# Patient Record
Sex: MALE | Race: WHITE | Employment: OTHER
[De-identification: names, ages, dates, MRNs, and addresses within clinical notes are randomized per-mention and may not be internally consistent; named-entity substitution may affect disease eponyms.]

---

## 2024-01-28 ENCOUNTER — APPOINTMENT (OUTPATIENT)
Facility: HOSPITAL | Age: 59
DRG: 312 | End: 2024-01-28
Payer: COMMERCIAL

## 2024-01-28 ENCOUNTER — HOSPITAL ENCOUNTER (INPATIENT)
Facility: HOSPITAL | Age: 59
LOS: 2 days | Discharge: HOME | DRG: 312 | End: 2024-01-30
Attending: EMERGENCY MEDICINE | Admitting: INTERNAL MEDICINE
Payer: COMMERCIAL

## 2024-01-28 DIAGNOSIS — R55 SYNCOPE AND COLLAPSE: Primary | ICD-10-CM

## 2024-01-28 LAB
ALBUMIN SERPL-MCNC: 3.9 G/DL (ref 3.5–5)
ALBUMIN/GLOB SERPL: 1.2 (ref 1.1–2.2)
ALP SERPL-CCNC: 66 U/L (ref 45–117)
ALT SERPL-CCNC: 59 U/L (ref 12–78)
ANION GAP SERPL CALC-SCNC: 5 MMOL/L (ref 5–15)
AST SERPL-CCNC: 29 U/L (ref 15–37)
BASOPHILS # BLD: 0.1 K/UL (ref 0–0.1)
BASOPHILS NFR BLD: 1 % (ref 0–1)
BILIRUB SERPL-MCNC: 0.6 MG/DL (ref 0.2–1)
BUN SERPL-MCNC: 15 MG/DL (ref 6–20)
BUN/CREAT SERPL: 15 (ref 12–20)
CALCIUM SERPL-MCNC: 9 MG/DL (ref 8.5–10.1)
CHLORIDE SERPL-SCNC: 107 MMOL/L (ref 97–108)
CO2 SERPL-SCNC: 27 MMOL/L (ref 21–32)
COMMENT:: NORMAL
CREAT SERPL-MCNC: 1.02 MG/DL (ref 0.7–1.3)
D DIMER PPP FEU-MCNC: <0.19 MG/L FEU (ref 0–0.65)
DIFFERENTIAL METHOD BLD: ABNORMAL
EKG ATRIAL RATE: 57 BPM
EKG DIAGNOSIS: NORMAL
EKG P AXIS: 71 DEGREES
EKG P-R INTERVAL: 160 MS
EKG Q-T INTERVAL: 424 MS
EKG QRS DURATION: 86 MS
EKG QTC CALCULATION (BAZETT): 412 MS
EKG R AXIS: 21 DEGREES
EKG T AXIS: 40 DEGREES
EKG VENTRICULAR RATE: 57 BPM
EOSINOPHIL # BLD: 0.3 K/UL (ref 0–0.4)
EOSINOPHIL NFR BLD: 4 % (ref 0–7)
ERYTHROCYTE [DISTWIDTH] IN BLOOD BY AUTOMATED COUNT: 12.4 % (ref 11.5–14.5)
GLOBULIN SER CALC-MCNC: 3.3 G/DL (ref 2–4)
GLUCOSE BLD STRIP.AUTO-MCNC: 140 MG/DL (ref 65–117)
GLUCOSE SERPL-MCNC: 146 MG/DL (ref 65–100)
HCT VFR BLD AUTO: 41.1 % (ref 36.6–50.3)
HGB BLD-MCNC: 14.3 G/DL (ref 12.1–17)
IMM GRANULOCYTES # BLD AUTO: 0.1 K/UL (ref 0–0.04)
IMM GRANULOCYTES NFR BLD AUTO: 1 % (ref 0–0.5)
LYMPHOCYTES # BLD: 2.3 K/UL (ref 0.8–3.5)
LYMPHOCYTES NFR BLD: 33 % (ref 12–49)
MAGNESIUM SERPL-MCNC: 2.2 MG/DL (ref 1.6–2.4)
MCH RBC QN AUTO: 30.8 PG (ref 26–34)
MCHC RBC AUTO-ENTMCNC: 34.8 G/DL (ref 30–36.5)
MCV RBC AUTO: 88.6 FL (ref 80–99)
MONOCYTES # BLD: 0.6 K/UL (ref 0–1)
MONOCYTES NFR BLD: 9 % (ref 5–13)
NEUTS SEG # BLD: 3.5 K/UL (ref 1.8–8)
NEUTS SEG NFR BLD: 52 % (ref 32–75)
NRBC # BLD: 0 K/UL (ref 0–0.01)
NRBC BLD-RTO: 0 PER 100 WBC
PLATELET # BLD AUTO: 264 K/UL (ref 150–400)
PMV BLD AUTO: 10 FL (ref 8.9–12.9)
POTASSIUM SERPL-SCNC: 4.4 MMOL/L (ref 3.5–5.1)
PROT SERPL-MCNC: 7.2 G/DL (ref 6.4–8.2)
RBC # BLD AUTO: 4.64 M/UL (ref 4.1–5.7)
SERVICE CMNT-IMP: ABNORMAL
SODIUM SERPL-SCNC: 139 MMOL/L (ref 136–145)
SPECIMEN HOLD: NORMAL
TROPONIN I SERPL HS-MCNC: 5 NG/L (ref 0–76)
TROPONIN I SERPL HS-MCNC: 6 NG/L (ref 0–76)
WBC # BLD AUTO: 6.8 K/UL (ref 4.1–11.1)

## 2024-01-28 PROCEDURE — 96374 THER/PROPH/DIAG INJ IV PUSH: CPT

## 2024-01-28 PROCEDURE — 83735 ASSAY OF MAGNESIUM: CPT

## 2024-01-28 PROCEDURE — 2580000003 HC RX 258: Performed by: EMERGENCY MEDICINE

## 2024-01-28 PROCEDURE — 93005 ELECTROCARDIOGRAM TRACING: CPT | Performed by: EMERGENCY MEDICINE

## 2024-01-28 PROCEDURE — 2060000000 HC ICU INTERMEDIATE R&B

## 2024-01-28 PROCEDURE — 36415 COLL VENOUS BLD VENIPUNCTURE: CPT

## 2024-01-28 PROCEDURE — 99285 EMERGENCY DEPT VISIT HI MDM: CPT

## 2024-01-28 PROCEDURE — 85025 COMPLETE CBC W/AUTO DIFF WBC: CPT

## 2024-01-28 PROCEDURE — 84484 ASSAY OF TROPONIN QUANT: CPT

## 2024-01-28 PROCEDURE — 85379 FIBRIN DEGRADATION QUANT: CPT

## 2024-01-28 PROCEDURE — 71045 X-RAY EXAM CHEST 1 VIEW: CPT

## 2024-01-28 PROCEDURE — 70450 CT HEAD/BRAIN W/O DYE: CPT

## 2024-01-28 PROCEDURE — 6360000002 HC RX W HCPCS: Performed by: EMERGENCY MEDICINE

## 2024-01-28 PROCEDURE — 96361 HYDRATE IV INFUSION ADD-ON: CPT

## 2024-01-28 PROCEDURE — 93010 ELECTROCARDIOGRAM REPORT: CPT | Performed by: INTERNAL MEDICINE

## 2024-01-28 PROCEDURE — 80053 COMPREHEN METABOLIC PANEL: CPT

## 2024-01-28 PROCEDURE — 82962 GLUCOSE BLOOD TEST: CPT

## 2024-01-28 RX ORDER — TIMOLOL MALEATE 5 MG/ML
1 SOLUTION/ DROPS OPHTHALMIC DAILY
COMMUNITY

## 2024-01-28 RX ORDER — ROSUVASTATIN CALCIUM 5 MG/1
5 TABLET, COATED ORAL DAILY
COMMUNITY

## 2024-01-28 RX ORDER — ACETAMINOPHEN 650 MG/1
650 SUPPOSITORY RECTAL EVERY 6 HOURS PRN
Status: DISCONTINUED | OUTPATIENT
Start: 2024-01-28 | End: 2024-01-30 | Stop reason: HOSPADM

## 2024-01-28 RX ORDER — DORZOLAMIDE HCL 20 MG/ML
1 SOLUTION/ DROPS OPHTHALMIC 2 TIMES DAILY
COMMUNITY

## 2024-01-28 RX ORDER — SODIUM CHLORIDE 9 MG/ML
INJECTION, SOLUTION INTRAVENOUS PRN
Status: DISCONTINUED | OUTPATIENT
Start: 2024-01-28 | End: 2024-01-30 | Stop reason: HOSPADM

## 2024-01-28 RX ORDER — ONDANSETRON 2 MG/ML
4 INJECTION INTRAMUSCULAR; INTRAVENOUS EVERY 6 HOURS PRN
Status: DISCONTINUED | OUTPATIENT
Start: 2024-01-28 | End: 2024-01-30 | Stop reason: HOSPADM

## 2024-01-28 RX ORDER — ALBUTEROL SULFATE 90 UG/1
2 AEROSOL, METERED RESPIRATORY (INHALATION) DAILY PRN
COMMUNITY

## 2024-01-28 RX ORDER — 0.9 % SODIUM CHLORIDE 0.9 %
1000 INTRAVENOUS SOLUTION INTRAVENOUS ONCE
Status: COMPLETED | OUTPATIENT
Start: 2024-01-28 | End: 2024-01-28

## 2024-01-28 RX ORDER — LATANOPROST 50 UG/ML
1 SOLUTION/ DROPS OPHTHALMIC NIGHTLY
COMMUNITY

## 2024-01-28 RX ORDER — ONDANSETRON 4 MG/1
4 TABLET, ORALLY DISINTEGRATING ORAL EVERY 8 HOURS PRN
Status: DISCONTINUED | OUTPATIENT
Start: 2024-01-28 | End: 2024-01-30 | Stop reason: HOSPADM

## 2024-01-28 RX ORDER — POLYETHYLENE GLYCOL 3350 17 G/17G
17 POWDER, FOR SOLUTION ORAL DAILY PRN
Status: DISCONTINUED | OUTPATIENT
Start: 2024-01-28 | End: 2024-01-30 | Stop reason: HOSPADM

## 2024-01-28 RX ORDER — ACETAMINOPHEN 325 MG/1
650 TABLET ORAL EVERY 6 HOURS PRN
Status: DISCONTINUED | OUTPATIENT
Start: 2024-01-28 | End: 2024-01-30 | Stop reason: HOSPADM

## 2024-01-28 RX ORDER — BRIMONIDINE TARTRATE 1 MG/ML
1 SOLUTION/ DROPS OPHTHALMIC 2 TIMES DAILY
COMMUNITY

## 2024-01-28 RX ORDER — SODIUM CHLORIDE 0.9 % (FLUSH) 0.9 %
5-40 SYRINGE (ML) INJECTION EVERY 12 HOURS SCHEDULED
Status: DISCONTINUED | OUTPATIENT
Start: 2024-01-28 | End: 2024-01-30 | Stop reason: HOSPADM

## 2024-01-28 RX ORDER — ONDANSETRON 2 MG/ML
4 INJECTION INTRAMUSCULAR; INTRAVENOUS ONCE
Status: COMPLETED | OUTPATIENT
Start: 2024-01-28 | End: 2024-01-28

## 2024-01-28 RX ORDER — ENOXAPARIN SODIUM 100 MG/ML
40 INJECTION SUBCUTANEOUS DAILY
Status: DISCONTINUED | OUTPATIENT
Start: 2024-01-28 | End: 2024-01-28

## 2024-01-28 RX ORDER — SODIUM CHLORIDE 0.9 % (FLUSH) 0.9 %
5-40 SYRINGE (ML) INJECTION PRN
Status: DISCONTINUED | OUTPATIENT
Start: 2024-01-28 | End: 2024-01-30 | Stop reason: HOSPADM

## 2024-01-28 RX ORDER — AMLODIPINE BESYLATE 10 MG/1
10 TABLET ORAL DAILY
Status: ON HOLD | COMMUNITY
End: 2024-01-30

## 2024-01-28 RX ORDER — ENOXAPARIN SODIUM 100 MG/ML
40 INJECTION SUBCUTANEOUS DAILY
Status: DISCONTINUED | OUTPATIENT
Start: 2024-01-29 | End: 2024-01-30 | Stop reason: HOSPADM

## 2024-01-28 RX ADMIN — SODIUM CHLORIDE 1000 ML: 9 INJECTION, SOLUTION INTRAVENOUS at 13:18

## 2024-01-28 RX ADMIN — ONDANSETRON HYDROCHLORIDE 4 MG: 2 INJECTION, SOLUTION INTRAMUSCULAR; INTRAVENOUS at 13:18

## 2024-01-28 ASSESSMENT — PAIN DESCRIPTION - ORIENTATION: ORIENTATION: MID

## 2024-01-28 ASSESSMENT — PAIN SCALES - GENERAL: PAINLEVEL_OUTOF10: 3

## 2024-01-28 ASSESSMENT — PAIN DESCRIPTION - LOCATION: LOCATION: CHEST

## 2024-01-28 ASSESSMENT — PAIN - FUNCTIONAL ASSESSMENT: PAIN_FUNCTIONAL_ASSESSMENT: 0-10

## 2024-01-28 ASSESSMENT — PAIN DESCRIPTION - DESCRIPTORS: DESCRIPTORS: TIGHTNESS

## 2024-01-28 NOTE — ED TRIAGE NOTES
Patient arrives by EMS for syncopal episode that occurred approximately 30 minutes prior to arrival while at [facility]. Patient reports some chest tightness, feelings of temperature fluctuations, and nausea prior to the episode. Reports losing consciousness and wife states he may have hit his head.

## 2024-01-28 NOTE — H&P
history were personally reviewed, all pertinent and relevant details are outlined as above.    Objective:   BP (!) 140/81   Pulse 69   Temp 97.4 °F (36.3 °C) (Oral)   Resp 15   SpO2 96%         PHYSICAL EXAM:   General: Alert x oriented x 3, awake, no acute distress,   HEENT: PEERL, EOMI, moist mucus membranes  Neck: Supple, no JVD, no meningeal signs  Chest: Clear to auscultation bilaterally   CVS: RRR, S1 S2 heard, no murmurs/rubs/gallops  Abd: Soft, non-tender, non-distended, +bowel sounds   Ext: No clubbing, no cyanosis, no edema  Neuro/Psych: Pleasant mood and affect, CN 2-12 grossly intact, sensory grossly within normal limit, Strength 5/5 in all extremities  Cap refill: Brisk, less than 3 seconds  Pulses: 2+, symmetric in all extremities  Skin: Warm, dry, without rashes or lesions    Data Review:   I have independently reviewed and interpreted patient's lab and all other diagnostic data    Abnormal Labs Reviewed   CBC WITH AUTO DIFFERENTIAL - Abnormal; Notable for the following components:       Result Value    Immature Granulocytes 1 (*)     Absolute Immature Granulocyte 0.1 (*)     All other components within normal limits   COMPREHENSIVE METABOLIC PANEL - Abnormal; Notable for the following components:    Glucose 146 (*)     All other components within normal limits   POCT GLUCOSE - Abnormal; Notable for the following components:    POC Glucose 140 (*)     All other components within normal limits       Results       ** No results found for the last 336 hours. **             IMAGING:   CT Head W/O Contrast   Final Result   No evidence of acute process.            XR CHEST PORTABLE   Final Result   No focal lung opacity or acute cardiopulmonary abnormality.                     ECG/ECHO:    Encounter Date: 01/28/24   EKG 12 Lead   Result Value    Ventricular Rate 57    Atrial Rate 57    P-R Interval 160    QRS Duration 86    Q-T Interval 424    QTc Calculation (Bazett) 412    P Axis 71    R Axis 21    T

## 2024-01-28 NOTE — ED PROVIDER NOTES
[facility] EMERGENCY DEP  EMERGENCY DEPARTMENT ENCOUNTER      Pt Name: Alvin López  MRN: [id number]  Birthdate 1965  Date of evaluation: 1/28/2024  Provider: Meño Stovall MD    CHIEF COMPLAINT       Chief Complaint   Patient presents with    Chest Pain    Loss of Consciousness         HISTORY OF PRESENT ILLNESS   (Location/Symptom, Timing/Onset, Context/Setting, Quality, Duration, Modifying Factors, Severity)  Note limiting factors.   Alvin López is a 58 y.o. male who presents to the emergency department      The history is provided by the patient. No  was used.   Loss of Consciousness  Episode history:  Single  Most recent episode:  Today  Timing:  Rare  Progression:  Resolved  Chronicity:  New  Context: medication change    Witnessed: yes    Ineffective treatments:  None tried  Associated symptoms: chest pain, dizziness, fever, malaise/fatigue, nausea and vomiting    Associated symptoms: no confusion, no focal weakness, no headaches, no palpitations, no seizures, no shortness of breath and no weakness        Nursing Notes were reviewed.    REVIEW OF SYSTEMS    (2-9 systems for level 4, 10 or more for level 5)     Review of Systems   Constitutional:  Positive for chills, fever and malaise/fatigue. Negative for activity change.   HENT:  Negative for nosebleeds.    Eyes:  Negative for visual disturbance.   Respiratory:  Negative for shortness of breath.    Cardiovascular:  Positive for chest pain and syncope. Negative for palpitations.   Gastrointestinal:  Positive for nausea and vomiting. Negative for abdominal pain, constipation and diarrhea.   Genitourinary:  Negative for difficulty urinating, dysuria, hematuria and urgency.   Musculoskeletal:  Negative for back pain, neck pain and neck stiffness.   Skin:  Negative for color change.   Allergic/Immunologic: Negative for immunocompromised state.   Neurological:  Positive for dizziness. Negative for focal weakness, seizures, syncope,

## 2024-01-28 NOTE — PROGRESS NOTES
Admission Medication Reconciliation:    Information obtained from:  Patient  RxQuery data available¹:  Yes    Comments/Recommendations: Updated PTA meds/reviewed patient's allergies.    1)  Patient was a good historian. Of note: primary provider had recently increased amlodipine from 5mg to 10mg. Also, patient stopped taking Rosuvastatin due to increased heart rate.    2)  Medication changes (since last review):  Added  - All medications    Adjusted  - None    Removed  - None     ¹RxQuery pharmacy benefit data reflects medications filled and processed through the patient's insurance, however   this data does NOT capture whether the medication was picked up or is currently being taken by the patient.    Allergies:  Patient has no known allergies.    Significant PMH/Disease States: No past medical history on file.  Chief Complaint for this Admission:    Chief Complaint   Patient presents with    Chest Pain    Loss of Consciousness     Prior to Admission Medications:   Prior to Admission Medications   Prescriptions Last Dose Informant   albuterol sulfate HFA (VENTOLIN HFA) 108 (90 Base) MCG/ACT inhaler     Sig: Inhale 2 puffs into the lungs daily as needed for Wheezing or Shortness of Breath (when excersising)   amLODIPine (NORVASC) 10 MG tablet     Sig: Take 1 tablet by mouth daily   brimonidine (ALPHAGAN P) 0.1 % SOLN     Sig: Place 1 drop into both eyes 2 times daily   dorzolamide (TRUSOPT) 2 % ophthalmic solution     Sig: Place 1 drop into the right eye in the morning and at bedtime   latanoprost (XALATAN) 0.005 % ophthalmic solution     Sig: Place 1 drop into both eyes nightly   rosuvastatin (CRESTOR) 5 MG tablet     Sig: Take 1 tablet by mouth daily   timolol (TIMOPTIC) 0.5 % ophthalmic solution     Sig: Place 1 drop into both eyes daily      Facility-Administered Medications: None     Please contact the main inpatient pharmacy with any questions or concerns at [phone] and we will direct you to the

## 2024-01-28 NOTE — ED NOTES
Patient's wife updating staff that patient received chest compression by bystanders who stated he appeared dusky.

## 2024-01-29 ENCOUNTER — APPOINTMENT (OUTPATIENT)
Facility: HOSPITAL | Age: 59
DRG: 312 | End: 2024-01-29
Attending: INTERNAL MEDICINE
Payer: COMMERCIAL

## 2024-01-29 ENCOUNTER — APPOINTMENT (OUTPATIENT)
Facility: HOSPITAL | Age: 59
DRG: 312 | End: 2024-01-29
Payer: COMMERCIAL

## 2024-01-29 LAB
ALBUMIN SERPL-MCNC: 3.4 G/DL (ref 3.5–5)
ALBUMIN/GLOB SERPL: 1.1 (ref 1.1–2.2)
ALP SERPL-CCNC: 60 U/L (ref 45–117)
ALT SERPL-CCNC: 48 U/L (ref 12–78)
ANION GAP SERPL CALC-SCNC: 6 MMOL/L (ref 5–15)
AST SERPL-CCNC: 20 U/L (ref 15–37)
BILIRUB SERPL-MCNC: 0.5 MG/DL (ref 0.2–1)
BUN SERPL-MCNC: 12 MG/DL (ref 6–20)
BUN/CREAT SERPL: 13 (ref 12–20)
CALCIUM SERPL-MCNC: 8.6 MG/DL (ref 8.5–10.1)
CHLORIDE SERPL-SCNC: 110 MMOL/L (ref 97–108)
CHOLEST SERPL-MCNC: 160 MG/DL
CO2 SERPL-SCNC: 26 MMOL/L (ref 21–32)
COMMENT:: NORMAL
CREAT SERPL-MCNC: 0.92 MG/DL (ref 0.7–1.3)
ECHO AO ROOT DIAM: 3.6 CM
ECHO AO ROOT INDEX: 1.67 CM/M2
ECHO AV PEAK GRADIENT: 5 MMHG
ECHO AV PEAK VELOCITY: 1.1 M/S
ECHO AV VELOCITY RATIO: 0.91
ECHO BSA: 2.19 M2
ECHO EST RA PRESSURE: 3 MMHG
ECHO LA DIAMETER INDEX: 1.57 CM/M2
ECHO LA DIAMETER: 3.4 CM
ECHO LA TO AORTIC ROOT RATIO: 0.94
ECHO LA VOL A-L A2C: 50 ML (ref 18–58)
ECHO LA VOL A-L A4C: 24 ML (ref 18–58)
ECHO LA VOL MOD A2C: 47 ML (ref 18–58)
ECHO LA VOL MOD A4C: 23 ML (ref 18–58)
ECHO LA VOLUME AREA LENGTH: 38 ML
ECHO LA VOLUME INDEX A-L A2C: 23 ML/M2 (ref 16–34)
ECHO LA VOLUME INDEX A-L A4C: 11 ML/M2 (ref 16–34)
ECHO LA VOLUME INDEX AREA LENGTH: 18 ML/M2 (ref 16–34)
ECHO LA VOLUME INDEX MOD A2C: 22 ML/M2 (ref 16–34)
ECHO LA VOLUME INDEX MOD A4C: 11 ML/M2 (ref 16–34)
ECHO LV E' LATERAL VELOCITY: 10 CM/S
ECHO LV E' SEPTAL VELOCITY: 7 CM/S
ECHO LV FRACTIONAL SHORTENING: 34 % (ref 28–44)
ECHO LV INTERNAL DIMENSION DIASTOLE INDEX: 2.31 CM/M2
ECHO LV INTERNAL DIMENSION DIASTOLIC: 5 CM (ref 4.2–5.9)
ECHO LV INTERNAL DIMENSION SYSTOLIC INDEX: 1.53 CM/M2
ECHO LV INTERNAL DIMENSION SYSTOLIC: 3.3 CM
ECHO LV IVSD: 0.8 CM (ref 0.6–1)
ECHO LV MASS 2D: 146.8 G (ref 88–224)
ECHO LV MASS INDEX 2D: 68 G/M2 (ref 49–115)
ECHO LV POSTERIOR WALL DIASTOLIC: 0.9 CM (ref 0.6–1)
ECHO LV RELATIVE WALL THICKNESS RATIO: 0.36
ECHO LVOT PEAK GRADIENT: 4 MMHG
ECHO LVOT PEAK VELOCITY: 1 M/S
ECHO MV A VELOCITY: 0.83 M/S
ECHO MV AREA PHT: 2.6 CM2
ECHO MV E DECELERATION TIME (DT): 289.3 MS
ECHO MV E VELOCITY: 0.7 M/S
ECHO MV E/A RATIO: 0.84
ECHO MV E/E' LATERAL: 7
ECHO MV E/E' RATIO (AVERAGED): 8.5
ECHO MV PRESSURE HALF TIME (PHT): 83.9 MS
ECHO PV MAX VELOCITY: 1.3 M/S
ECHO PV PEAK GRADIENT: 7 MMHG
ECHO RIGHT VENTRICULAR SYSTOLIC PRESSURE (RVSP): 27 MMHG
ECHO RV TAPSE: 2.3 CM (ref 1.7–?)
ECHO TV REGURGITANT MAX VELOCITY: 2.43 M/S
ECHO TV REGURGITANT PEAK GRADIENT: 24 MMHG
ERYTHROCYTE [DISTWIDTH] IN BLOOD BY AUTOMATED COUNT: 12.5 % (ref 11.5–14.5)
EST. AVERAGE GLUCOSE BLD GHB EST-MCNC: 97 MG/DL
GLOBULIN SER CALC-MCNC: 3.1 G/DL (ref 2–4)
GLUCOSE SERPL-MCNC: 94 MG/DL (ref 65–100)
HBA1C MFR BLD: 5 % (ref 4–5.6)
HCT VFR BLD AUTO: 39.2 % (ref 36.6–50.3)
HDLC SERPL-MCNC: 35 MG/DL
HDLC SERPL: 4.6 (ref 0–5)
HGB BLD-MCNC: 13.5 G/DL (ref 12.1–17)
INR PPP: 1.1 (ref 0.9–1.1)
LDLC SERPL CALC-MCNC: 106.6 MG/DL (ref 0–100)
MAGNESIUM SERPL-MCNC: 2.2 MG/DL (ref 1.6–2.4)
MCH RBC QN AUTO: 30.7 PG (ref 26–34)
MCHC RBC AUTO-ENTMCNC: 34.4 G/DL (ref 30–36.5)
MCV RBC AUTO: 89.1 FL (ref 80–99)
NRBC # BLD: 0 K/UL (ref 0–0.01)
NRBC BLD-RTO: 0 PER 100 WBC
PHOSPHATE SERPL-MCNC: 3 MG/DL (ref 2.6–4.7)
PLATELET # BLD AUTO: 221 K/UL (ref 150–400)
PMV BLD AUTO: 10 FL (ref 8.9–12.9)
POTASSIUM SERPL-SCNC: 4.1 MMOL/L (ref 3.5–5.1)
PROT SERPL-MCNC: 6.5 G/DL (ref 6.4–8.2)
PROTHROMBIN TIME: 11.7 SEC (ref 9–11.1)
RBC # BLD AUTO: 4.4 M/UL (ref 4.1–5.7)
SODIUM SERPL-SCNC: 142 MMOL/L (ref 136–145)
SPECIMEN HOLD: NORMAL
TRIGL SERPL-MCNC: 92 MG/DL
TSH SERPL DL<=0.05 MIU/L-ACNC: 2.54 UIU/ML (ref 0.36–3.74)
VLDLC SERPL CALC-MCNC: 18.4 MG/DL
WBC # BLD AUTO: 9.3 K/UL (ref 4.1–11.1)

## 2024-01-29 PROCEDURE — 2060000000 HC ICU INTERMEDIATE R&B

## 2024-01-29 PROCEDURE — 36415 COLL VENOUS BLD VENIPUNCTURE: CPT

## 2024-01-29 PROCEDURE — 78452 HT MUSCLE IMAGE SPECT MULT: CPT

## 2024-01-29 PROCEDURE — A9500 TC99M SESTAMIBI: HCPCS | Performed by: INTERNAL MEDICINE

## 2024-01-29 PROCEDURE — 83036 HEMOGLOBIN GLYCOSYLATED A1C: CPT

## 2024-01-29 PROCEDURE — 85610 PROTHROMBIN TIME: CPT

## 2024-01-29 PROCEDURE — 80061 LIPID PANEL: CPT

## 2024-01-29 PROCEDURE — 84100 ASSAY OF PHOSPHORUS: CPT

## 2024-01-29 PROCEDURE — 2580000003 HC RX 258: Performed by: INTERNAL MEDICINE

## 2024-01-29 PROCEDURE — 80053 COMPREHEN METABOLIC PANEL: CPT

## 2024-01-29 PROCEDURE — 85027 COMPLETE CBC AUTOMATED: CPT

## 2024-01-29 PROCEDURE — 93306 TTE W/DOPPLER COMPLETE: CPT

## 2024-01-29 PROCEDURE — 83735 ASSAY OF MAGNESIUM: CPT

## 2024-01-29 PROCEDURE — 3430000000 HC RX DIAGNOSTIC RADIOPHARMACEUTICAL: Performed by: INTERNAL MEDICINE

## 2024-01-29 PROCEDURE — 84443 ASSAY THYROID STIM HORMONE: CPT

## 2024-01-29 PROCEDURE — 93017 CV STRESS TEST TRACING ONLY: CPT

## 2024-01-29 RX ORDER — TETRAKIS(2-METHOXYISOBUTYLISOCYANIDE)COPPER(I) TETRAFLUOROBORATE 1 MG/ML
32.5 INJECTION, POWDER, LYOPHILIZED, FOR SOLUTION INTRAVENOUS
Status: COMPLETED | OUTPATIENT
Start: 2024-01-29 | End: 2024-01-29

## 2024-01-29 RX ADMIN — Medication 10 ML: at 21:27

## 2024-01-29 RX ADMIN — TETRAKIS(2-METHOXYISOBUTYLISOCYANIDE)COPPER(I) TETRAFLUOROBORATE 32.5 MILLICURIE: 1 INJECTION, POWDER, LYOPHILIZED, FOR SOLUTION INTRAVENOUS at 11:30

## 2024-01-29 ASSESSMENT — LIFESTYLE VARIABLES
HOW OFTEN DO YOU HAVE A DRINK CONTAINING ALCOHOL: 2-3 TIMES A WEEK
HOW MANY STANDARD DRINKS CONTAINING ALCOHOL DO YOU HAVE ON A TYPICAL DAY: 1 OR 2

## 2024-01-29 ASSESSMENT — PAIN SCALES - GENERAL: PAINLEVEL_OUTOF10: 0

## 2024-01-29 NOTE — CONSULTS
CARDIOLOGY CONSULT                  Subjective:    Date of  Admission:   Admission type:Emergency    Ramon Noe MD     This is a 58 yom with HTN who presented after a syncopal episode. He had been doing well and recently had his amlodipine increased. He was at [facility] and was standing when he passed out. There were multiple HCPs there including a cardiac RN. No pulse was noted and so he received chest compressions. An ICD was obtained but was not needed as he had begun to awake. He feels well now without any complaints. He is physically active and has not appreciated any change in his activity tolerance.    Patient Active Problem List   Diagnosis    Syncope and collapse      PMH - HTN  PSH - no cardiac  FH - NC  Social History     Socioeconomic History    Marital status:      Spouse name: Not on file    Number of children: Not on file    Years of education: Not on file    Highest education level: Not on file   Occupational History    Not on file   Tobacco Use    Smoking status: Not on file    Smokeless tobacco: Not on file   Substance and Sexual Activity    Alcohol use: Not on file    Drug use: Not on file    Sexual activity: Not on file   Other Topics Concern    Not on file   Social History Narrative    Not on file     Social Determinants of Health     Financial Resource Strain: Not on file   Food Insecurity: Not on file   Transportation Needs: Not on file   Physical Activity: Not on file   Stress: Not on file   Social Connections: Not on file   Intimate Partner Violence: Not on file   Housing Stability: Not on file        Current Facility-Administered Medications   Medication Dose Route Frequency    sodium chloride flush 0.9 % injection 5-40 mL  5-40 mL IntraVENous 2 times per day    sodium chloride flush 0.9 % injection 5-40 mL  5-40 mL IntraVENous PRN    0.9 % sodium chloride infusion   IntraVENous PRN    ondansetron (ZOFRAN-ODT) disintegrating tablet 4 mg  4 mg Oral Q8H PRN

## 2024-01-29 NOTE — PROGRESS NOTES
Hospitalist Progress Note  Dia Alarcon MD  Answering service: [phone] OR [phone] from in house phone        Date of Service:  2024  NAME:  Alvin López  :  1965  MRN:  [id number]      Admission Summary:   Alvin López is a 58 y.o. male with a PMHx of HTN, hyperlipidemia (new diagnosis, was started on a statin less than a month ago), and asthma who comes to the ED after an episode of cardiac arrest.  Patient does not recall the episode, so all history was obtained from his wife at the bedside.      Earlier today, patient was at [employer], when he started feeling dizzy/lightheaded.  He sat down, rested for a few minutes, felt better.  He and his wife left [facility] earlier.  He walked outside and collapsed to the ground.  Per wife to nurses attending [facility] ran towards the patient.  He did not have a pulse, they initiated chest compressions.  Defibrillator was also brought, but did not deliver a shock as patient was already starting to wake up.  Per wife, pads were never connected to the monitor, so she is not sure whether patient was in asystole or not.  However, one of the nurses who was assisting the patient/doing chest compressions was a cardiac RN.  911 was called and patient was brought to the emergency room for further evaluation.  His vital signs are stable at the time of my evaluation and he is in normal sinus rhythm with a heart rate in the 70s.  He denies any chest pain and did not have any chest pain immediately prior to the episode of loss of consciousness/collapse.  His initial troponin in the emergency room is 5.  Patient denies any shortness of breath.  He takes Norvasc for hypertension, dose was recently increased from 5 to 10 mg.  Patient takes Norvasc at bedtime and he took it last night.  He reports occasional palpitations, especially in the early morning, but he is unable to provide additional

## 2024-01-29 NOTE — ED NOTES
TRANSFER - OUT REPORT:    Verbal report given to NICOLLE Brasher on Alvin López  being transferred to [facility] for routine progression of patient care       Report consisted of patient's Situation, Background, Assessment and   Recommendations(SBAR).     Information from the following report(s) Nurse Handoff Report, Index, ED Encounter Summary, ED SBAR, Adult Overview, Intake/Output, MAR, and Recent Results was reviewed with the receiving nurse.    [place] Fall Assessment:    Presents to emergency department  because of falls (Syncope, seizure, or loss of consciousness): Yes  Age > 70: No  Altered Mental Status, Intoxication with alcohol or substance confusion (Disorientation, impaired judgment, poor safety awaremess, or inability to follow instructions): No  Impaired Mobility: Ambulates or transfers with assistive devices or assistance; Unable to ambulate or transer.: No  Nursing Judgement: Yes          Lines:   Peripheral IV 01/28/24 Right Antecubital (Active)   Line Status Brisk blood return 01/28/24 1239   Line Care Connections checked and tightened 01/28/24 1239   Phlebitis Assessment No symptoms 01/28/24 1239   Infiltration Assessment 0 01/28/24 1239   Dressing Status Clean, dry & intact 01/28/24 1239   Dressing Type Transparent 01/28/24 1239   Dressing Intervention New 01/28/24 1239        Opportunity for questions and clarification was provided.      Patient transported with:  Monitor and Registered Nurse

## 2024-01-30 ENCOUNTER — APPOINTMENT (OUTPATIENT)
Facility: HOSPITAL | Age: 59
DRG: 312 | End: 2024-01-30
Payer: COMMERCIAL

## 2024-01-30 VITALS
DIASTOLIC BLOOD PRESSURE: 84 MMHG | RESPIRATION RATE: 12 BRPM | BODY MASS INDEX: 29.63 KG/M2 | HEIGHT: 71 IN | WEIGHT: 211.64 LBS | SYSTOLIC BLOOD PRESSURE: 128 MMHG | TEMPERATURE: 98 F | HEART RATE: 64 BPM | OXYGEN SATURATION: 97 %

## 2024-01-30 LAB
ALBUMIN SERPL-MCNC: 3.5 G/DL (ref 3.5–5)
ALBUMIN/GLOB SERPL: 1.2 (ref 1.1–2.2)
ALP SERPL-CCNC: 58 U/L (ref 45–117)
ALT SERPL-CCNC: 39 U/L (ref 12–78)
ANION GAP SERPL CALC-SCNC: 5 MMOL/L (ref 5–15)
AST SERPL-CCNC: 15 U/L (ref 15–37)
BILIRUB SERPL-MCNC: 0.5 MG/DL (ref 0.2–1)
BUN SERPL-MCNC: 13 MG/DL (ref 6–20)
BUN/CREAT SERPL: 15 (ref 12–20)
CALCIUM SERPL-MCNC: 9 MG/DL (ref 8.5–10.1)
CHLORIDE SERPL-SCNC: 110 MMOL/L (ref 97–108)
CO2 SERPL-SCNC: 25 MMOL/L (ref 21–32)
CREAT SERPL-MCNC: 0.87 MG/DL (ref 0.7–1.3)
ECHO BSA: 2.19 M2
EKG ATRIAL RATE: 57 BPM
EKG DIAGNOSIS: NORMAL
EKG DIAGNOSIS: NORMAL
EKG P AXIS: 60 DEGREES
EKG P-R INTERVAL: 156 MS
EKG Q-T INTERVAL: 428 MS
EKG QRS DURATION: 84 MS
EKG QTC CALCULATION (BAZETT): 416 MS
EKG R AXIS: 9 DEGREES
EKG T AXIS: 25 DEGREES
EKG VENTRICULAR RATE: 57 BPM
ERYTHROCYTE [DISTWIDTH] IN BLOOD BY AUTOMATED COUNT: 12.4 % (ref 11.5–14.5)
GLOBULIN SER CALC-MCNC: 3 G/DL (ref 2–4)
GLUCOSE SERPL-MCNC: 99 MG/DL (ref 65–100)
HCT VFR BLD AUTO: 40 % (ref 36.6–50.3)
HGB BLD-MCNC: 13.2 G/DL (ref 12.1–17)
INR PPP: 1.1 (ref 0.9–1.1)
MAGNESIUM SERPL-MCNC: 2.2 MG/DL (ref 1.6–2.4)
MCH RBC QN AUTO: 30.1 PG (ref 26–34)
MCHC RBC AUTO-ENTMCNC: 33 G/DL (ref 30–36.5)
MCV RBC AUTO: 91.3 FL (ref 80–99)
NRBC # BLD: 0 K/UL (ref 0–0.01)
NRBC BLD-RTO: 0 PER 100 WBC
PHOSPHATE SERPL-MCNC: 3 MG/DL (ref 2.6–4.7)
PLATELET # BLD AUTO: 216 K/UL (ref 150–400)
PMV BLD AUTO: 10.2 FL (ref 8.9–12.9)
POTASSIUM SERPL-SCNC: 4 MMOL/L (ref 3.5–5.1)
PROT SERPL-MCNC: 6.5 G/DL (ref 6.4–8.2)
PROTHROMBIN TIME: 11.1 SEC (ref 9–11.1)
RBC # BLD AUTO: 4.38 M/UL (ref 4.1–5.7)
SODIUM SERPL-SCNC: 140 MMOL/L (ref 136–145)
STRESS BASELINE DIAS BP: 68 MMHG
STRESS BASELINE HR: 76 BPM
STRESS BASELINE ST DEPRESSION: 0 MM
STRESS BASELINE SYS BP: 144 MMHG
STRESS ESTIMATED WORKLOAD: 10.1 METS
STRESS PEAK DIAS BP: 98 MMHG
STRESS PEAK SYS BP: 165 MMHG
STRESS PERCENT HR ACHIEVED: 97 %
STRESS POST PEAK HR: 157 BPM
STRESS RATE PRESSURE PRODUCT: NORMAL BPM*MMHG
STRESS TARGET HR: 162 BPM
WBC # BLD AUTO: 6.6 K/UL (ref 4.1–11.1)

## 2024-01-30 PROCEDURE — 85610 PROTHROMBIN TIME: CPT

## 2024-01-30 PROCEDURE — 2580000003 HC RX 258: Performed by: INTERNAL MEDICINE

## 2024-01-30 PROCEDURE — A9500 TC99M SESTAMIBI: HCPCS | Performed by: INTERNAL MEDICINE

## 2024-01-30 PROCEDURE — 93005 ELECTROCARDIOGRAM TRACING: CPT | Performed by: INTERNAL MEDICINE

## 2024-01-30 PROCEDURE — 84100 ASSAY OF PHOSPHORUS: CPT

## 2024-01-30 PROCEDURE — 80053 COMPREHEN METABOLIC PANEL: CPT

## 2024-01-30 PROCEDURE — 83735 ASSAY OF MAGNESIUM: CPT

## 2024-01-30 PROCEDURE — 3430000000 HC RX DIAGNOSTIC RADIOPHARMACEUTICAL: Performed by: INTERNAL MEDICINE

## 2024-01-30 PROCEDURE — 36415 COLL VENOUS BLD VENIPUNCTURE: CPT

## 2024-01-30 PROCEDURE — 85027 COMPLETE CBC AUTOMATED: CPT

## 2024-01-30 RX ORDER — TETRAKIS(2-METHOXYISOBUTYLISOCYANIDE)COPPER(I) TETRAFLUOROBORATE 1 MG/ML
33 INJECTION, POWDER, LYOPHILIZED, FOR SOLUTION INTRAVENOUS
Status: COMPLETED | OUTPATIENT
Start: 2024-01-30 | End: 2024-01-30

## 2024-01-30 RX ORDER — AMLODIPINE BESYLATE 10 MG/1
5 TABLET ORAL
Qty: 30 TABLET | Refills: 3 | Status: SHIPPED | OUTPATIENT
Start: 2024-01-30

## 2024-01-30 RX ORDER — TIMOLOL MALEATE 5 MG/ML
1 SOLUTION/ DROPS OPHTHALMIC DAILY
Status: DISCONTINUED | OUTPATIENT
Start: 2024-01-30 | End: 2024-01-30 | Stop reason: HOSPADM

## 2024-01-30 RX ORDER — BRIMONIDINE TARTRATE 1 MG/ML
1 SOLUTION/ DROPS OPHTHALMIC 2 TIMES DAILY
Status: DISCONTINUED | OUTPATIENT
Start: 2024-01-30 | End: 2024-01-30 | Stop reason: ALTCHOICE

## 2024-01-30 RX ORDER — DORZOLAMIDE HCL 20 MG/ML
1 SOLUTION/ DROPS OPHTHALMIC 2 TIMES DAILY
Status: DISCONTINUED | OUTPATIENT
Start: 2024-01-30 | End: 2024-01-30 | Stop reason: HOSPADM

## 2024-01-30 RX ORDER — REGADENOSON 0.08 MG/ML
0.4 INJECTION, SOLUTION INTRAVENOUS
Status: DISCONTINUED | OUTPATIENT
Start: 2024-01-30 | End: 2024-01-30 | Stop reason: ALTCHOICE

## 2024-01-30 RX ORDER — LATANOPROST 50 UG/ML
1 SOLUTION/ DROPS OPHTHALMIC NIGHTLY
Status: DISCONTINUED | OUTPATIENT
Start: 2024-01-30 | End: 2024-01-30 | Stop reason: HOSPADM

## 2024-01-30 RX ORDER — ROSUVASTATIN CALCIUM 10 MG/1
5 TABLET, COATED ORAL DAILY
Status: DISCONTINUED | OUTPATIENT
Start: 2024-01-30 | End: 2024-01-30 | Stop reason: HOSPADM

## 2024-01-30 RX ADMIN — Medication 10 ML: at 11:59

## 2024-01-30 RX ADMIN — TETRAKIS(2-METHOXYISOBUTYLISOCYANIDE)COPPER(I) TETRAFLUOROBORATE 33 MILLICURIE: 1 INJECTION, POWDER, LYOPHILIZED, FOR SOLUTION INTRAVENOUS at 09:25

## 2024-01-30 NOTE — PROGRESS NOTES
Referral source:   Alvin López at [facility] in [facility] 4 IMCU 2.  attended rounds in the IMCU as part of the Interdisciplinary team where the patient's ongoing care was discussed. I reviewed the medical record as part of this encounter.     Outcome: Interdisciplinary team are aware of  availability and were encouraged to request support as needed.      Advised nurse to contact [facility] for any further referrals.The  on-call can be reached at [phone]).     Rev. Catherine Navarro MDiv, [facility]  Staff

## 2024-01-30 NOTE — DISCHARGE INSTRUCTIONS
Discharge Instructions       PATIENT ID: Alvin López  MRN: [id number]   YOB: 1965    DATE OF ADMISSION: 1/28/2024   DATE OF DISCHARGE: 1/30/2024    PRIMARY CARE PROVIDER: Ramon Noe     ATTENDING PHYSICIAN: Dia Alarcon MD   DISCHARGING PROVIDER: Dia Alarcon MD    To contact this individual call [phone] and ask the  to page.   If unavailable ask to be transferred the Adult Hospitalist Department.    DISCHARGE DIAGNOSES   Syncope, suspect orthostatic    CONSULTATIONS: Cardiology    PROCEDURES/SURGERIES: * No surgery found *    PENDING TEST RESULTS:   At the time of discharge the following test results are still pending: None    FOLLOW UP APPOINTMENTS:   PCP in 1-2 weeks for general medical follow-up and medications refills;   Cardiology in 1-2 weeks for blood pressure check and to discuss the outpatient cardiac monitor;     ADDITIONAL CARE RECOMMENDATIONS:   Please take all your medications as prescribed;     DIET: cardiac diet    ACTIVITY: activity as tolerated    WOUND CARE: n/a    EQUIPMENT needed: None      DISCHARGE MEDICATIONS:   See Medication Reconciliation Form    It is important that you take the medication exactly as they are prescribed.   Keep your medication in the bottles provided by the pharmacist and keep a list of the medication names, dosages, and times to be taken in your wallet.   Do not take other medications without consulting your doctor.       NOTIFY YOUR PHYSICIAN FOR ANY OF THE FOLLOWING:   Fever over 101 degrees for 24 hours.   Chest pain, shortness of breath, fever, chills, nausea, vomiting, diarrhea, change in mentation, falling, weakness, bleeding. Severe pain or pain not relieved by medications.  Or, any other signs or symptoms that you may have questions about.      DISPOSITION:   x Home With:   OT  PT  HH  RN       SNF/Inpatient Rehab/LTAC    Independent/assisted living    Hospice    Other:     CDMP Checked:   Yes IK     PROBLEM LIST

## 2024-01-30 NOTE — PROGRESS NOTES
Cardiology Progress Note  2024     Admit Date: 2024  Admit Diagnosis: Syncope and collapse [R55]  CC: none currently    Assessment:   Principal Problem:    Syncope and collapse  Resolved Problems:    * No resolved hospital problems. *    Plan:     Echo with no significant findings  Stress imaging pending, no EKG changes with exercises  OK for D/C from cardiac perspective if no significant findings on stress test; he will be scheduled for OP monitoring and F/U with Dr. Clarke    Subjective:      Alvin López has no c/o. MOSES o/n.    Objective:    Physical Exam:  Overall VSSAF;  BP (!) 144/68   Pulse 70   Temp 98.3 °F (36.8 °C) (Oral)   Resp 12   Ht 1.8 m (5' 10.87\")   Wt 96 kg (211 lb 10.3 oz)   SpO2 97%   BMI 29.63 kg/m²   Temp (24hrs), Av.1 °F (36.7 °C), Min:97.7 °F (36.5 °C), Max:98.3 °F (36.8 °C)    Patient Vitals for the past 8 hrs:   Pulse   24 1000 70   24 0900 67   24 0641 53   24 0611 51   24 0600 53   24 0556 51   24 0526 65   24 0359 52   24 0315 54    Patient Vitals for the past 8 hrs:   Resp   24 0641 12   24 0611 12   24 0556 15   24 0526 11   24 0315 12    Patient Vitals for the past 8 hrs:   BP   24 0900 (!) 144/68   24 0641 128/84   24 0611 111/63   24 0556 112/84   24 0526 117/66   24 0315 114/69      No intake/output data recorded.      General Appearance: Well developed, well nourished, no acute distress.   Ears/Nose/Mouth/Throat:   Normal MM; anicteric.     JVP: WNL   Resp:   Lungs clear to auscultation bilaterally.  Nl resp effort.   Cardiovascular:  RRR, S1, S2 normal, no new murmur. No gallop or rub.   Abdomen:   Soft, non-tender, bowel sounds are present.   Extremities: No edema bilaterally.    Skin:  Neuro: Warm and dry.  A/O x3, grossly nonfocal                         Data Review:     Telemetry independently reviewed :   NSR       ECG independently

## 2024-01-30 NOTE — DISCHARGE SUMMARY
(90 Base) MCG/ACT inhaler  Generic drug: albuterol sulfate HFA               Where to Get Your Medications        Information about where to get these medications is not yet available    Ask your nurse or doctor about these medications  amLODIPine 10 MG tablet           NOTIFY YOUR PHYSICIAN FOR ANY OF THE FOLLOWING:   Fever over 101 degrees for 24 hours.   Chest pain, shortness of breath, fever, chills, nausea, vomiting, diarrhea, change in mentation, falling, weakness, bleeding. Severe pain or pain not relieved by medications.  Or, any other signs or symptoms that you may have questions about.    DISPOSITION:    Home With:   OT  PT  HH  RN       Long term SNF/Inpatient Rehab    Independent/assisted living    Hospice    Other:       PATIENT CONDITION AT DISCHARGE:     Functional status    Poor     Deconditioned     Independent      Cognition     Lucid     Forgetful     Dementia      Catheters/lines (plus indication)    Mehta     PICC     PEG     None      Code status     Full code     DNR      PHYSICAL EXAMINATION AT DISCHARGE:    General : alert x 3, awake, no acute distress,   HEENT: PEERL, EOMI, moist mucus membrane  Neck: supple, no JVD, no meningeal signs  Chest: Clear to auscultation bilaterally   CVS: S1 S2 heard, Capillary refill less than 2 seconds  Abd: soft/ Non tender, non distended, BS physiological,   Ext: no clubbing, no cyanosis, no edema, brisk 2+ DP pulses  Neuro/Psych: pleasant mood and affect, CN 2-12 grossly intact, sensory grossly within normal limit, Strength 5/5 in all extremities  Skin: warm     CHRONIC MEDICAL DIAGNOSES:  Principal Problem:    Syncope and collapse  Resolved Problems:    * No resolved hospital problems. *        Greater than 31 minutes were spent with the patient on counseling and coordination of care    Signed:   Dia Alarcon MD  1/30/2024  2:52 PM

## 2024-01-31 NOTE — PROGRESS NOTES
Reviewed discharge instructions and all questions. Patients verbalized understanding, patient was discharged with belonging to discharge area       Medication List        CHANGE how you take these medications      amLODIPine 10 MG tablet  Commonly known as: NORVASC  Take 0.5 tablets by mouth nightly as needed (SBP >150 mmHg)  What changed:   how much to take  when to take this  reasons to take this            CONTINUE taking these medications      brimonidine 0.1 % Soln  Commonly known as: ALPHAGAN P     dorzolamide 2 % ophthalmic solution  Commonly known as: TRUSOPT     latanoprost 0.005 % ophthalmic solution  Commonly known as: XALATAN     rosuvastatin 5 MG tablet  Commonly known as: CRESTOR     timolol 0.5 % ophthalmic solution  Commonly known as: TIMOPTIC     Ventolin  (90 Base) MCG/ACT inhaler  Generic drug: albuterol sulfate HFA               Where to Get Your Medications        Information about where to get these medications is not yet available    Ask your nurse or doctor about these medications  amLODIPine 10 MG tablet